# Patient Record
Sex: FEMALE | Race: BLACK OR AFRICAN AMERICAN | NOT HISPANIC OR LATINO | URBAN - METROPOLITAN AREA
[De-identification: names, ages, dates, MRNs, and addresses within clinical notes are randomized per-mention and may not be internally consistent; named-entity substitution may affect disease eponyms.]

---

## 2020-10-29 LAB — BCS RECOMMENDATION EXT: NORMAL

## 2021-08-25 LAB
CRP SERPL-MCNC: 1.3 MG/DL (CALC) (ref 0–0.9)
ERYTHROCYTE [SEDIMENTATION RATE] IN BLOOD: 15 MM/HR (ref 0–20)
RHEUMATOID FACT SERPL-ACNC: <8.6 IU/ML (ref 0–14)
URATE SERPL-MCNC: 5.1 MG/DL (ref 2.6–7.2)

## 2022-01-07 ENCOUNTER — HISTORICAL (OUTPATIENT)
Dept: ADMINISTRATIVE | Facility: HOSPITAL | Age: 57
End: 2022-01-07

## 2022-05-01 ENCOUNTER — HISTORICAL (OUTPATIENT)
Dept: ADMINISTRATIVE | Facility: HOSPITAL | Age: 57
End: 2022-05-01
Payer: MEDICAID

## 2022-05-03 NOTE — HISTORICAL [facility] CERNER
This is a historical note converted from Itz. Formatting and pictures may have been removed.  Please reference Itz for original formatting and attached multimedia. Chief Complaint  right ankle pain  History of Present Illness  56?yo?female?non-smoker?presents to ortho clinic for?initial visit?for?right??anklepain.? Patient points to??medial ankle.  Onset:??approximately 1?year???fluctuates  Current pain level: 10/10??without pain medication. Quality described as??numbness? and tingling.? Patient?denies?use of pain medication today.?  Medications r/t complaint: Patient reports using?RX / OTC?medications in past including:?OTC pain relievers.?Currently taking?diclofenac, meloxicam??PRN?pain with?mild?relief  Modifying Factors: ?Worse with/after activity;?Improved with rest;?burning pain that radiates into calf );??pain increased with shoes ); ?No stiffness?;?pain related to weight bearing?  Injury:?Denies.???  Previous treatment:?None  Associated Symptoms:?No Crepitus/Grinding; ?Numbness/ tingling;??Swelling noted to ankle with increased activity;?No skin changes;?No weakness;?Mild decrease in ROM;?difficulty sleeping at night s/t pain  Activity:?Sedentary, full ADLs;?Pain interferes with function/daily activity (mild)?Patient?denies?use of assistive devices?for ambulation.?  PMH:? denies CVD; denies DM ; denies RA; denies gout; denies RX anticoagulants; denies intolerance to NSAIDs s/t GI issues; denies intolerance to NSAIDs s/t kidney disease; recent increase?in weight 20 pounds over last year  Family History:?Family history of arthritis  PCP:? Dr. 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  Note:??? none  Review of Systems  Constitutional:No fever;No chills;No weight loss  CV:No swelling;No edema  GI:No urinary retention;No urinary incontinence  :No fecal incontinence  Skin:No rash;No wound  Neuro:No numbness/tingling;No weakness;No saddle  anesthesia  MSK: As above  Psych:No depression;No anxiety  Heme/Lymph:No easy bruising;No easy bleeding;No lymphadenopathy  Immuno:No MRSA history  Physical Exam  Vitals & Measurements  T:?36.7? ?C (Oral)? HR:?67(Peripheral)? RR:?18? BP:?156/96?  HT:?160.00?cm? WT:?101.300?kg? BMI:?39.57?  MSK: ?Right????ankle  General: No apparent distress;?obese  Inspection:?Normal gait/ station?;??full weight bearing; over?pronation of ankle; pes planus; ??no swelling;?no erythema;?No contusion;??skin intact;? ?atrophy and/or deconditioning noted to calf ?;?wearing proper footwear;?ankle stability noted with step up;?normal and equal strength noted  Palpation:???non-tender;?bilaterally equal pedal pulses noted;??equal sensation to touch; ?Crepitus:?Negative;?Normal temperature  ?  ROM:?good, without limitation  Neurovascular:?Intact; no diffuse lower extremity edema noted  Neuro/Psych: Awake, Alert, Oriented; Normal mood and affect  Lymphatic: No LAD  Skin and Soft Tissue: No bruising, No ecchymosis; No rash  Cardiovascular: vascular integrity noted, 2+ symmetrical pulses, no edema  Respiratory: no increase in respiratory effort noted  Assessment/Plan  1.?Posterior tibialis tendon insufficiency?M76.829  ?1.? Discussed with patient diagnosis and treatment recommendations.? Handouts given.  2.? Imaging:?Radiological studies ordered, reviewed?and independently interpreted by me; discussed with patient.? no acute findings  3.? Treatment plan:?Patient instructed to do?home exercise program (includes?plantar flexion, dorsiflexion, foot circles, toe towel curls, and alphabet exercises)?with non-painful activity on ROM/STG exercises, TheraBand provided, increase intensity?gradually,?3-5 times? per week?until return to clinic appointment; proper footwear-supportive shoes with stiff soles,????RX custom orthotics with arch support and pronation correction  4.? Procedure:?none  5.? Activity:?activity as tolerated?; early mobilization and  early weight bearing as tolerated;? avoid painful activity; use rest, ice, compression, and elevation as needed for symptom relief or when over activity causes symptom increase  6.? Therapy:?Formal PT/OT ordered  7.? Medication:?First line treatment with short term OTC NSAIDs/Tylenol according to label instructions PRN pain; Medication precautions given; continue medications as RX per PCP  8.? RTC:?4 months  9.??Note:?Review of EMR completed with noted ? referral documentation reviewed  2.?Obesity?E66.9  ?Patient educated that diet modifications with low impact exercises as tolerated for reduction in BMI would improve overall treatment and outcome.??  NOTE Total Time 45 minutes  Extended time spent with patient collecting history and educating on DX and treatment plan.  Visit start time 1  10 minutes?spent prior to exam?reviewing EMR,?prior labs and x-rays.?  45 minutes spent?in exam room with the patient?completing exam,?obtaining history, reviewing results?and discussing?treatment plan and recommendations.  10 minutes?spent?after exam?completing?electronic?health record?documentation.  Visit end time   Problem List/Past Medical History  Ongoing  High cholesterol  HTN - Hypertension  Joint pain  Low back pain  Obesity  Right ankle pain  Right knee pain  Vitamin D deficiency  Historical  No qualifying data  Procedure/Surgical History  Colonoscopy (2021)   section   Medications  atorvastatin 10 mg oral tablet, 10 mg= 1 tab(s), Oral, Daily, 1 refills  diclofenac sodium 75 mg oral delayed release tablet, 75 mg= 1 tab(s), Oral, BID, 3 refills  lisinopril 20 mg oral tablet, 20 mg= 1 tab(s), Oral, Daily, 1 refills  Allergies  No Known Allergies  Social History  Abuse/Neglect  No, No, Yes, 2022  No, 2021  No, 2020  Employment/School  Unemployed, 2022  Tobacco  Never (less than 100 in lifetime), N/A, 2022  Family History  Hypertension.: Father.  Primary malignant neoplasm of  breast: Mother.  Health Maintenance  Health Maintenance  ???Pending?(in the next year)  ??? ??OverDue  ??? ? ? ?Influenza Vaccine due??10/01/21??and every 1??day(s)  ??? ??Due?  ??? ? ? ?Alcohol Misuse Screening due??01/02/22??and every 1??year(s)  ??? ? ? ?Aspirin Therapy for CVD Prevention due??01/07/22??and every 1??year(s)  ??? ? ? ?Cervical Cancer Screening due??01/07/22??Unknown Frequency  ??? ? ? ?Diabetes Screening due??01/07/22??Unknown Frequency  ??? ? ? ?Hypertension Management-Education due??01/07/22??and every 1??year(s)  ??? ? ? ?Hypertension Management-BMP due??01/07/22??Unknown Frequency  ??? ? ? ?Tetanus Vaccine due??01/07/22??and every 10??year(s)  ??? ? ? ?Zoster Vaccine due??01/07/22??Unknown Frequency  ??? ??Due In Future?  ??? ? ? ?ADL Screening not due until??03/16/22??and every 1??year(s)  ??? ? ? ?Breast Cancer Screening not due until??10/29/22??and every 2??year(s)  ??? ? ? ?Obesity Screening not due until??01/01/23??and every 1??year(s)  ???Satisfied?(in the past 1 year)  ??? ??Satisfied?  ??? ? ? ?ADL Screening on??03/16/21.??Satisfied by Cecile Krishna LPN  ??? ? ? ?Alcohol Misuse Screening on??03/16/21.??Satisfied by Cecile Krishna LPN.  ??? ? ? ?Blood Pressure Screening on??01/07/22.??Satisfied by Jaden Elizabeth LPN F.  ??? ? ? ?Body Mass Index Check on??01/07/22.??Satisfied by Calra BUTT Prenella F.  ??? ? ? ?Colorectal Screening on??06/15/21.??Satisfied by Cecile Krishna LPN  ??? ? ? ?Depression Screening on??01/07/22.??Satisfied by Jaden Elizabeth LPN  ??? ? ? ?Hypertension Management-Blood Pressure on??01/07/22.??Satisfied by Jaden Elizabeth LPN  ??? ? ? ?Influenza Vaccine on??01/07/22.??Satisfied by Jaden Elizabeth LPN  ??? ? ? ?Obesity Screening on??01/07/22.??Satisfied by Jaden Elizabeth LPN  ?  Diagnostic Results  01/07/2022 09:55 CST XR Ankle Right Minimum 3 Views  Radiology Report  Right ankle 3 views  INDICATION: Ankle  pain  FINDINGS: Bones are intact without fracture, dislocation, bone  destruction. Soft tissues are normal.  IMPRESSION:  No acute findings.

## 2022-05-03 NOTE — HISTORICAL [facility] C[facility]ER
This is a historical note converted from Itz. Formatting and pictures may have been removed.  Please reference Itz for original formatting and attached multimedia. Chief Complaint  in with c/o having right ankle pain for months, states it got worse tuesday, denies injury  History of Present Illness  Patient here with c/o right ankle pain and swelling that has been intermittent for the last year. She reports it occurring more frequently lately. She also c/o low back pain that is achy and shooting at times. She denies injury. She does report a family history of RA.  Review of Systems  Comprehensive Review of Systems performed with no exceptions other than as noted in HPI.  ?  ?  Physical Exam  Vitals & Measurements  T:?36.3? ?C (Temporal Artery)? HR:?75(Peripheral)? RR:?16? BP:?126/72? SpO2:?99%?  HT:?158.50?cm? WT:?96.600?kg? BMI:?38.45?  ?  Awake alert oriented x3, no acute distress.  Skin warm, dry, and intact.  Head normocephalic, atraumatic.  Eyes normal appearance.  Chest is clear bilaterally.  Cardiac rate and rhythm is regular, no murmurs.  Gait and station normal. Moving extremities equally. Right inner ankle with moderate swelling  Affect is normal and appropriate for age.  Assessment/Plan  1.?Right ankle pain ? M25.571  ?X-ray right ankle  Diclofenac 75mg BID  Medrol dose pack  Arthritis panel  2.?Low back pain ? M54.5  ?X-ray L-spine  Orders:  atorvastatin, 10 mg = 1 tab(s), Oral, Daily, # 90 tab(s), 1 Refill(s), Pharmacy: [id number], 158.5, cm, Height/Length Dosing, 08/25/21 8:39:00 CDT, 96.6, kg, Weight Dosing, 08/25/21 8:39:00 CDT  diclofenac, 75 mg = 1 tab(s), Oral, BID, # 60 tab(s), 3 Refill(s), Pharmacy: [facility] [id number] [id number], 158.5, cm, Height/Length Dosing, 08/25/21 8:39:00 CDT, 96.6, kg, Weight Dosing, 08/25/21 8:39:00 CDT  lisinopril, 20 mg = 1 tab(s), Oral, Daily, # 90 tab(s), 1 Refill(s), Pharmacy: [facility] [id number] [id number], 158.5, cm, Height/Length Dosing, 08/25/21 8:39:00 CDT,  96.6, kg, Weight Dosing, 08/25/21 8:39:00 CDT  methylPREDNISolone, = 1 packet(s), Oral, As Directed, as directed on package labeling, X 6 day(s), # 21 tab(s), 0 Refill(s), Pharmacy: [facility] [id number] [id number], 158.5, cm, Height/Length Dosing, 08/25/21 8:39:00 CDT, 96.6, kg, Weight Dosing, 08/25/21 8:39:00 CDT  Clinic Follow-up PRN, 08/25/21 8:47:00 CDT, KIMBERLY, Future Order  Office/Outpatient Visit Level 3 Established [id number] [facility], Right ankle pain  Low back pain, KIMBERLY, 08/25/21 8:47:00 CDT  Refill routine meds today  Referrals  Clinic Follow-up PRN, 08/25/21 8:47:00 CDT, KIMBERLY, Future Order   Problem List/Past Medical History  Ongoing  High cholesterol  HTN - Hypertension  Joint pain  Low back pain  Obesity  Right ankle pain  Right knee pain  Vitamin D deficiency  Historical  No qualifying data  Procedure/Surgical History  Colonoscopy (03/16/2021)   Medications  atorvastatin 10 mg oral tablet, 10 mg= 1 tab(s), Oral, Daily, 1 refills  diclofenac sodium 75 mg oral delayed release tablet, 75 mg= 1 tab(s), Oral, BID, 3 refills  lisinopril 20 mg oral tablet, 20 mg= 1 tab(s), Oral, Daily, 1 refills  Medrol Dosepak 4 mg oral tablet, 1 packet(s), Oral, As Directed  Allergies  No Known Allergies  Social History  Abuse/Neglect  No, 08/25/2021  No, 08/06/2020  Tobacco  Never (less than 100 in lifetime), N/A, 08/25/2021  Never (less than 100 in lifetime), N/A, 08/06/2020  Family History  Hypertension.: Father.  Primary malignant neoplasm of breast: Mother.  Health Maintenance  Health Maintenance  ???Pending?(in the next year)  ??? ??Due?  ??? ? ? ?Aspirin Therapy for CVD Prevention due??08/25/21??and every 1??year(s)  ??? ? ? ?Cervical Cancer Screening due??08/25/21??Unknown Frequency  ??? ? ? ?Diabetes Screening due??08/25/21??Unknown Frequency  ??? ? ? ?Hypertension Management-Education due??08/25/21??and every 1??year(s)  ??? ? ? ?Hypertension Management-BMP due??08/25/21??Unknown Frequency  ??? ? ? ?Tetanus Vaccine  due??08/25/21??and every 10??year(s)  ??? ? ? ?Zoster Vaccine due??08/25/21??Unknown Frequency  ??? ??Due In Future?  ??? ? ? ?Obesity Screening not due until??01/01/22??and every 1??year(s)  ??? ? ? ?Alcohol Misuse Screening not due until??01/02/22??and every 1??year(s)  ??? ? ? ?Body Mass Index Check not due until??03/16/22??and every 1??year(s)  ??? ? ? ?Depression Screening not due until??03/16/22??and every 1??year(s)  ??? ? ? ?ADL Screening not due until??03/16/22??and every 1??year(s)  ??? ? ? ?Blood Pressure Screening not due until??03/30/22??and every 1??year(s)  ??? ? ? ?Hypertension Management-Blood Pressure not due until??03/30/22??and every 1??year(s)  ???Satisfied?(in the past 1 year)  ??? ??Satisfied?  ??? ? ? ?ADL Screening on??03/16/21.??Satisfied by Cecile Krishna LPN G.  ??? ? ? ?Alcohol Misuse Screening on??03/16/21.??Satisfied by Cecile Krishna LPN G.  ??? ? ? ?Blood Pressure Screening on??08/25/21.??Satisfied by Jerry Kuo LPNy  ??? ? ? ?Body Mass Index Check on??08/25/21.??Satisfied by Decnemesio BUTT, Ladi  ??? ? ? ?Breast Cancer Screening on??10/29/20.??Satisfied by Ayaan BUTT Ladi  ??? ? ? ?Colorectal Screening on??06/15/21.??Satisfied by Gigi Krishna LPNi G.  ??? ? ? ?Depression Screening on??03/16/21.??Satisfied by Cecile Krishna LPN G.  ??? ? ? ?Hypertension Management-Blood Pressure on??08/25/21.??Satisfied by Ladi Kuo LPN  ??? ? ? ?Influenza Vaccine on??03/16/21.??Satisfied by Cecile Krishna LPN  ??? ? ? ?Obesity Screening on??08/25/21.??Satisfied by Ladi Kuo LPN  ?

## 2022-05-10 DIAGNOSIS — M76.829 POSTERIOR TIBIAL TENDON DYSFUNCTION: Primary | ICD-10-CM

## 2022-05-27 ENCOUNTER — HOSPITAL ENCOUNTER (OUTPATIENT)
Dept: RADIOLOGY | Facility: HOSPITAL | Age: 57
Discharge: HOME | End: 2022-05-27
Attending: NURSE PRACTITIONER
Payer: MEDICAID

## 2022-05-27 DIAGNOSIS — M76.829 POSTERIOR TIBIAL TENDON DYSFUNCTION: ICD-10-CM

## 2022-05-27 PROCEDURE — 73721 MRI JNT OF LWR EXTRE W/O DYE: CPT | Mod: TC,RT

## 2022-06-13 ENCOUNTER — OFFICE VISIT (OUTPATIENT)
Dept: ORTHOPEDICS | Facility: CLINIC | Age: 57
End: 2022-06-13
Payer: MEDICAID

## 2022-06-13 VITALS — WEIGHT: 230 LBS | BODY MASS INDEX: 40.75 KG/M2 | HEIGHT: 63 IN

## 2022-06-13 DIAGNOSIS — M25.871 ANKLE IMPINGEMENT SYNDROME, RIGHT: Primary | ICD-10-CM

## 2022-06-13 DIAGNOSIS — M67.979: ICD-10-CM

## 2022-06-13 PROCEDURE — 1159F MED LIST DOCD IN RCRD: CPT | Mod: CPTII,95,, | Performed by: NURSE PRACTITIONER

## 2022-06-13 PROCEDURE — 99214 PR OFFICE/OUTPT VISIT, EST, LEVL IV, 30-39 MIN: ICD-10-PCS | Mod: 95,,, | Performed by: NURSE PRACTITIONER

## 2022-06-13 PROCEDURE — 4010F ACE/ARB THERAPY RXD/TAKEN: CPT | Mod: CPTII,95,, | Performed by: NURSE PRACTITIONER

## 2022-06-13 PROCEDURE — 3008F PR BODY MASS INDEX (BMI) DOCUMENTED: ICD-10-PCS | Mod: CPTII,95,, | Performed by: NURSE PRACTITIONER

## 2022-06-13 PROCEDURE — 4010F PR ACE/ARB THEARPY RXD/TAKEN: ICD-10-PCS | Mod: CPTII,95,, | Performed by: NURSE PRACTITIONER

## 2022-06-13 PROCEDURE — 1160F RVW MEDS BY RX/DR IN RCRD: CPT | Mod: CPTII,95,, | Performed by: NURSE PRACTITIONER

## 2022-06-13 PROCEDURE — 99214 OFFICE O/P EST MOD 30 MIN: CPT | Mod: 95,,, | Performed by: NURSE PRACTITIONER

## 2022-06-13 PROCEDURE — 1159F PR MEDICATION LIST DOCUMENTED IN MEDICAL RECORD: ICD-10-PCS | Mod: CPTII,95,, | Performed by: NURSE PRACTITIONER

## 2022-06-13 PROCEDURE — 3008F BODY MASS INDEX DOCD: CPT | Mod: CPTII,95,, | Performed by: NURSE PRACTITIONER

## 2022-06-13 PROCEDURE — 1160F PR REVIEW ALL MEDS BY PRESCRIBER/CLIN PHARMACIST DOCUMENTED: ICD-10-PCS | Mod: CPTII,95,, | Performed by: NURSE PRACTITIONER

## 2022-06-13 RX ORDER — LISINOPRIL 20 MG/1
20 TABLET ORAL
COMMUNITY
Start: 2021-08-25

## 2022-06-13 RX ORDER — DICLOFENAC SODIUM 75 MG/1
75 TABLET, DELAYED RELEASE ORAL
COMMUNITY
Start: 2021-08-25

## 2022-06-13 RX ORDER — ATORVASTATIN CALCIUM 10 MG/1
TABLET, FILM COATED ORAL
COMMUNITY
Start: 2022-05-17

## 2022-06-13 NOTE — PROGRESS NOTES
Telemedicine Consent  The patient location is: Home  The chief complaint leading to consultation is: right ankle pain  Visit type: Audio only per patient request due to technical issues.   Time with patient: 30 minutes  30 minutes of total time spent on the encounter, which includes face-to-face time and non-face-to-face time preparing to see the patient (eg. review of tests), obtaining and/or reviewing separately obtained history, documenting clinical information in the electronic or other health record, independently interpreting results (not separately reported) and communicating results to the patient/family/caregiver or care coordination (not separately reported).   I have reviewed patient's name,  and picture ID if applicable.  I discussed with patient regarding medical services by telemedicine (1) informed of the relationship between the physician and patient and the respective role of any other health care provider with respect to management of the patient (2) session are not recorded and personal health information is protected; and (3) notified that he or she may decline to receive medical services by telemedicine and may withdraw from such care at any time.  Patient consented to consult by telemedicine.     Subjective:       Follow up    Patient ID: Muriel Yadav is a 56 y.o. female. Non-smoker. Employment HX: [employer], currently retired.    Seen [facility] orth[facility] for same DX since 22.     Chief Complaint: Pain of the Right Ankle and MRI Results    HPI:    Right aching, numbing and tingling diffuse ankle pain.   Injury: no known injury  Onset: several years ago worsening over time  Modifying Factors: worse with activity, improved with rest, radiates into calf, increased with wearing shoes and increased with weight bearing   Associated Symptoms: numbness, swelling with increased activity, weakness without falls, decreased ROM and difficulty sleeping s/t pain  Activity: sedentary with light activity and  "pain mildly interferes with ADLs   Previous Treatments: HEP withTheraBand, RX NSAIDs, BMI reduction, RX PT completed 12 wks/ 2 xs per week d/c'd 2022 and RX orthotics without significant relief  PMH: recent significant increase in weight   Family History: + OA    Note: MRI results today.  No change in symptoms since prior visit.     Current Outpatient Medications:     atorvastatin (LIPITOR) 10 MG tablet, , Disp: , Rfl:     diclofenac (VOLTAREN) 75 MG EC tablet, Take 75 mg by mouth., Disp: , Rfl:     lisinopriL (PRINIVIL,ZESTRIL) 20 MG tablet, Take 20 mg by mouth., Disp: , Rfl:     Review of patient's allergies indicates:  No Known Allergies    No results found for: HGBA1C   Body mass index is 40.74 kg/m².   Vitals:    06/13/22 1003 06/13/22 1004   Weight: 104.3 kg (230 lb)    Height: 5' 3" (1.6 m)    PainSc:   7   7      Review of Systems:  A ten-point review of systems was performed and is negative, except as mentioned above.       Objective:   N/a telemedicine visit  Assessment:       Imaging: X-ray dated 1/7/22, reviewed and independently interpreted by me. Discussed with patient. No acute findings .  MRI results dated 5/30/22 ordered and independently reviewed, see below radiology findings.     [facility] Radiology Report 1/7/22  Right ankle 3 views  INDICATION: Ankle pain  FINDINGS: Bones are intact without fracture, dislocation, bone  destruction. Soft tissues are normal.  IMPRESSION:  No acute findings.    MRI ANKLE WITHOUT CONTRAST RIGHT 5/30/22  CLINICAL HISTORY:   m76.829;  Posterior tibial tendinitis, unspecified leg  TECHNIQUE:  Unenhanced, multiplanar, multisequence MR imaging of the ankle was obtained  COMPARISON:  Radiographs right ankle used for comparison dated 01/07/2022  FINDINGS:  Borderline pes planus.  Degenerative calcaneal enthesopathy.  No stress or insufficiency fracture.  No osteochondral defects seen to the talar dome.  The base of the 2nd metatarsal is well aligned with the middle " cuneiform.  No ankle or subtalar joint effusion.  Advanced distal posterior tibial tendonitis associated with a small type 1 os navicularis.  No organized tear.  The Achilles tendon is normal in course and caliber.  The anterior and posterior tendons are normal in course and caliber.  The peroneal tendons are normal in course and caliber.  Marked subcutaneous edema is present over the medial aspect of the midfoot and extends into the tarsal tunnel.  This is best seen on image 15 of series 12.  The syndesmosis is intact.  Talofibular ligaments are intact.  The anterior fibers of the deltoid ligament complex are disorganized and acutely inflamed on image 17 of series 10..  Spring ligament is intact.  Lisfranc ligament is intact.  Short plantar ligament is intact.  Plantar fascia is unremarkable.  The intrinsic muscles are intact.  Impression:  Advanced distal posterior tibial tendinitis without organized tear.  This is associated with a small type 1 os navicularis and subtle stress reaction at the tendon insertion on the navicular.  Marked subcutaneous edema is present over the medial malleolus, the medial aspect of the midfoot, and the distal posterior tibial tendon.  The anterior fibers of the deltoid ligament complex are acutely inflamed and disorganized consistent with a low-grade partial-thickness tear (image 18 series 10).  Together these findings can be associated with recent inversion mechanism, medial ankle impingement, or least likely, tarsal tunnel syndrome.  Please correlate clinically    EMR Review: completed with noted prior visit 4/12/22 reviewed.    1. Ankle impingement syndrome, right    2. Disorder of tendon of posterior tibial muscle    3. BMI 40.0-44.9, adult      Plan:       1. Educated patient about DX and treatment recommendations.  2. Procedure: none  3. Treatment plan: Long discussion had about reducing BMI in order to reduce symptoms.  Highly recommended patient reduce BMI prior to surgical  evaluations. Patient would still like to discuss MRI and possible surgical treatment options with ortho res. surgeon.  Aware no procedures are guaranteed, but will refer since patient has completed all conservative treatments with no improvement.   4. RX Medications: continue medications as RX per PCP  5. RTC: next available ortho res. for evaluation.     Babr Capellan FNP    Timed Billing Note  Total Time Spent with Patient: 30 minutes  Visit Start Time: 1030  10 minutes spent prior to exam reviewing EMR, prior labs and x-rays.  10 minutes spent in exam with patient completing exam, obtaining history, educating on DX and treatment plan.  10 minutes spent after exam completing EHR documentation.   Visit End Time: 1100

## 2022-07-18 ENCOUNTER — OFFICE VISIT (OUTPATIENT)
Dept: ORTHOPEDICS | Facility: CLINIC | Age: 57
End: 2022-07-18
Payer: MEDICAID

## 2022-07-18 VITALS
HEART RATE: 65 BPM | WEIGHT: 216 LBS | HEIGHT: 63 IN | OXYGEN SATURATION: 98 % | DIASTOLIC BLOOD PRESSURE: 83 MMHG | SYSTOLIC BLOOD PRESSURE: 138 MMHG | RESPIRATION RATE: 12 BRPM | TEMPERATURE: 98 F | BODY MASS INDEX: 38.27 KG/M2

## 2022-07-18 DIAGNOSIS — M21.42 PES PLANUS OF BOTH FEET: ICD-10-CM

## 2022-07-18 DIAGNOSIS — M67.979: Primary | ICD-10-CM

## 2022-07-18 DIAGNOSIS — M25.571 CHRONIC PAIN OF RIGHT ANKLE: ICD-10-CM

## 2022-07-18 DIAGNOSIS — M21.41 PES PLANUS OF BOTH FEET: ICD-10-CM

## 2022-07-18 DIAGNOSIS — M76.829 POSTERIOR TIBIALIS TENDON INSUFFICIENCY: ICD-10-CM

## 2022-07-18 DIAGNOSIS — G89.29 CHRONIC PAIN OF RIGHT ANKLE: ICD-10-CM

## 2022-07-18 PROBLEM — M21.40 FLAT FOOT: Status: ACTIVE | Noted: 2022-07-18

## 2022-07-18 PROCEDURE — 4010F ACE/ARB THERAPY RXD/TAKEN: CPT | Mod: CPTII,,, | Performed by: ORTHOPAEDIC SURGERY

## 2022-07-18 PROCEDURE — 3008F BODY MASS INDEX DOCD: CPT | Mod: CPTII,,, | Performed by: ORTHOPAEDIC SURGERY

## 2022-07-18 PROCEDURE — 99214 OFFICE O/P EST MOD 30 MIN: CPT | Mod: PBBFAC | Performed by: ORTHOPAEDIC SURGERY

## 2022-07-18 PROCEDURE — 4010F PR ACE/ARB THEARPY RXD/TAKEN: ICD-10-PCS | Mod: CPTII,,, | Performed by: ORTHOPAEDIC SURGERY

## 2022-07-18 PROCEDURE — 3079F DIAST BP 80-89 MM HG: CPT | Mod: CPTII,,, | Performed by: ORTHOPAEDIC SURGERY

## 2022-07-18 PROCEDURE — 3008F PR BODY MASS INDEX (BMI) DOCUMENTED: ICD-10-PCS | Mod: CPTII,,, | Performed by: ORTHOPAEDIC SURGERY

## 2022-07-18 PROCEDURE — 1159F MED LIST DOCD IN RCRD: CPT | Mod: CPTII,,, | Performed by: ORTHOPAEDIC SURGERY

## 2022-07-18 PROCEDURE — 99214 PR OFFICE/OUTPT VISIT, EST, LEVL IV, 30-39 MIN: ICD-10-PCS | Mod: S$PBB,,, | Performed by: ORTHOPAEDIC SURGERY

## 2022-07-18 PROCEDURE — 3075F PR MOST RECENT SYSTOLIC BLOOD PRESS GE 130-139MM HG: ICD-10-PCS | Mod: CPTII,,, | Performed by: ORTHOPAEDIC SURGERY

## 2022-07-18 PROCEDURE — 3079F PR MOST RECENT DIASTOLIC BLOOD PRESSURE 80-89 MM HG: ICD-10-PCS | Mod: CPTII,,, | Performed by: ORTHOPAEDIC SURGERY

## 2022-07-18 PROCEDURE — 3075F SYST BP GE 130 - 139MM HG: CPT | Mod: CPTII,,, | Performed by: ORTHOPAEDIC SURGERY

## 2022-07-18 PROCEDURE — 1159F PR MEDICATION LIST DOCUMENTED IN MEDICAL RECORD: ICD-10-PCS | Mod: CPTII,,, | Performed by: ORTHOPAEDIC SURGERY

## 2022-07-18 PROCEDURE — 99214 OFFICE O/P EST MOD 30 MIN: CPT | Mod: PBBFAC

## 2022-07-18 PROCEDURE — 99214 OFFICE O/P EST MOD 30 MIN: CPT | Mod: S$PBB,,, | Performed by: ORTHOPAEDIC SURGERY

## 2022-07-18 RX ORDER — DICLOFENAC SODIUM 10 MG/G
2 GEL TOPICAL 4 TIMES DAILY
Qty: 1 EACH | Refills: 0 | Status: SHIPPED | OUTPATIENT
Start: 2022-07-18

## 2022-07-18 NOTE — PATIENT INSTRUCTIONS
56F here for R foot/ankle pain with R>L flatfeet and R PTT tendonitis on MRI with  Stabe IIB PTT insufficiency, wants to avoid surgery which I think is reasonable.  - Recommend she continue her medial post inserts which do help, can add a piece of felt/Moleskin on the medial post.  - Rx for topical NSAID cream  - Recommend continued ice, elevation and PO NSAID (Advil) as needed  - Recommend cushioned mats in her kitchen as well  - Follow up as needed

## 2022-07-18 NOTE — PROGRESS NOTES
ATTENDING ADDENDUM    Muriel Yadav  was evaluated at the time of the encounter with Dr Morales PGY5.  HPI, PE and treatment plan was reviewed. Treatment plan was reasonable and necessary. Imaging was reviewed at the time of visit.

## 2022-07-18 NOTE — PROGRESS NOTES
[facility]  Established Patient Office Visit  07/18/2022       Patient ID: Muriel Yadav  YOB: 1965  MRN: [id number]    Diagnosis:    The primary encounter diagnosis was Disorder of tendon of posterior tibial muscle. Diagnoses of Pes planus of both feet, Posterior tibialis tendon insufficiency, and Chronic pain of right ankle were also pertinent to this visit.     Chief Complaint: Pain of the Right Ankle    Muriel Yadav is a 56 y.o. female who presents as a new patient for treatment of the above mentioned diagnosis.    56F here for R foot/ankle pain with R>L flatfeet and R PTT tendonitis on MRI, here as a new patient.  She says she has had right foot and ankle pain since she was a teenager.  It pretty much always hurt on the medial side of her ankle and she denies particular injury or twisting.  She says sometimes the pain goes down to the bottom of her foot.  He has previously done physical therapy for this and still does exercises at home.  She has had inserts for about the past year which do help a lot.  She takes Advil as needed, but less than once or twice a week.  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  She says she is now retired and is not as bad.  She did have an MRI recently with report of right posterior tib tendon tendinitis without tear.  It previously tried a topical gel which also helped to some degree.  Denies numbing or tingling in the foot.  She would like to avoid surgery if at all possible.    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 No pertinent family history.  Social History     Socioeconomic History     Marital status: Single   Tobacco Use    Smoking status: Never Smoker    Smokeless tobacco: Never Used   Substance and Sexual Activity    Alcohol use: Never    Drug use: Never     Medication List with Changes/Refills   New Medications    DICLOFENAC SODIUM (VOLTAREN) 1 % GEL    Apply 2 g topically 4 (four) times daily. Apply cream/gel to foot/ankle as needed for pain and inflammation   Current Medications    ATORVASTATIN (LIPITOR) 10 MG TABLET        DICLOFENAC (VOLTAREN) 75 MG EC TABLET    Take 75 mg by mouth.    LISINOPRIL (PRINIVIL,ZESTRIL) 20 MG TABLET    Take 20 mg by mouth.     Review of patient's allergies indicates:  No Known Allergies    ROS:    Body mass index is 38.26 kg/m².  GENERAL: Well appearing, appropriate for stated age, no acute distress.  CARDIOVASCULAR: Pulses regular by peripheral palpation.  PULMONARY: Respirations are even and non-labored.  NEURO: Awake, alert, and oriented x 3.  PSYCH: Mood & affect are appropriate.  HEENT: Head is normocephalic and atraumatic.    Physical Exam:    R foot/ankle:  Pes planovalgus R>L  No abrasions or open wounds or other skin findings  Mild edema to the Medial and lateral ankle   nontender to palpation over the lateral malleolus or peroneal tendons  Minimally tender to palpation over the medial ankle, specifically over her PT insertion  Nontender over plantar fascia  Negative Tinel's over her tarsal tunnel  Too many toes on R  Unable to single leg raise bilaterally  SILT throughout  WWP    Imaging:    No image results found.     Relevant imaging results reviewed and interpreted by me, discussed with the patient and / or family today. Prior ankle XRs without acute fracture, no other abnormalities.  MRI R ankle:  Advanced distal posterior tibial tendinitis without organized tear.  This is associated with a small type 1 os navicularis and subtle stress reaction at the tendon insertion on the navicular.  Marked subcutaneous edema is present over the medial  malleolus, the medial aspect of the midfoot, and the distal posterior tibial tendon.  The anterior fibers of the deltoid ligament complex are acutely inflamed and disorganized consistent with a low-grade partial-thickness tear (image 18 series 10).  Together these findings can be associated with recent inversion mechanism, medial ankle impingement, or least likely, tarsal tunnel syndrome.  Please correlate clinically    Assessment and Plan:    Muriel Yadav is a 56 y.o. female seen in the office today for The primary encounter diagnosis was Disorder of tendon of posterior tibial muscle. Diagnoses of Pes planus of both feet, Posterior tibialis tendon insufficiency, and Chronic pain of right ankle were also pertinent to this visit.    56F here for R foot/ankle pain with R>L flatfeet and R PTT tendonitis on MRI with  Stabe IIB PTT insufficiency, wants to avoid surgery which I think is reasonable.  - Recommend she continue her medial post inserts which do help, can add a piece of felt/Moleskin on the medial post.  - Rx for topical NSAID cream  - Recommend continued ice, elevation and PO NSAID (Advil) as needed  - Recommend cushioned mats in her kitchen as well  - Follow up as needed    Orders Placed This Encounter    diclofenac sodium (VOLTAREN) 1 % Gel

## 2023-02-02 ENCOUNTER — DOCUMENTATION ONLY (OUTPATIENT)
Dept: ADMINISTRATIVE | Facility: HOSPITAL | Age: 58
End: 2023-02-02
Payer: MEDICAID